# Patient Record
Sex: FEMALE | Race: WHITE | NOT HISPANIC OR LATINO | Employment: FULL TIME | URBAN - METROPOLITAN AREA
[De-identification: names, ages, dates, MRNs, and addresses within clinical notes are randomized per-mention and may not be internally consistent; named-entity substitution may affect disease eponyms.]

---

## 2019-08-22 ENCOUNTER — TRANSCRIBE ORDERS (OUTPATIENT)
Dept: ADMINISTRATIVE | Facility: HOSPITAL | Age: 39
End: 2019-08-22

## 2019-08-22 DIAGNOSIS — Z12.31 VISIT FOR SCREENING MAMMOGRAM: Primary | ICD-10-CM

## 2019-10-18 ENCOUNTER — APPOINTMENT (OUTPATIENT)
Dept: MAMMOGRAPHY | Facility: HOSPITAL | Age: 39
End: 2019-10-18

## 2020-01-02 ENCOUNTER — HOSPITAL ENCOUNTER (OUTPATIENT)
Dept: MAMMOGRAPHY | Facility: HOSPITAL | Age: 40
Discharge: HOME | End: 2020-01-02
Admitting: OBSTETRICS & GYNECOLOGY

## 2020-01-02 DIAGNOSIS — Z12.31 VISIT FOR SCREENING MAMMOGRAM: ICD-10-CM

## 2020-01-02 PROCEDURE — 77063 BREAST TOMOSYNTHESIS BI: CPT

## 2020-01-02 PROCEDURE — 77067 SCR MAMMO BI INCL CAD: CPT

## 2020-01-02 PROCEDURE — 77063 BREAST TOMOSYNTHESIS BI: CPT | Performed by: RADIOLOGY

## 2020-01-02 PROCEDURE — 77067 SCR MAMMO BI INCL CAD: CPT | Performed by: RADIOLOGY

## 2020-01-30 ENCOUNTER — HOSPITAL ENCOUNTER (OUTPATIENT)
Dept: MAMMOGRAPHY | Facility: HOSPITAL | Age: 40
Discharge: HOME | End: 2020-01-30
Admitting: RADIOLOGY

## 2020-01-30 ENCOUNTER — TRANSCRIBE ORDERS (OUTPATIENT)
Dept: MAMMOGRAPHY | Facility: HOSPITAL | Age: 40
End: 2020-01-30

## 2020-01-30 ENCOUNTER — HOSPITAL ENCOUNTER (OUTPATIENT)
Dept: ULTRASOUND IMAGING | Facility: HOSPITAL | Age: 40
Discharge: HOME | End: 2020-01-30

## 2020-01-30 DIAGNOSIS — R92.8 ABNORMAL MAMMOGRAM: Primary | ICD-10-CM

## 2020-01-30 DIAGNOSIS — R92.8 ABNORMAL MAMMOGRAM: ICD-10-CM

## 2020-01-30 PROCEDURE — 76641 ULTRASOUND BREAST COMPLETE: CPT | Performed by: RADIOLOGY

## 2020-01-30 PROCEDURE — 76641 ULTRASOUND BREAST COMPLETE: CPT

## 2020-01-30 PROCEDURE — 77066 DX MAMMO INCL CAD BI: CPT | Performed by: RADIOLOGY

## 2020-01-30 PROCEDURE — 77062 BREAST TOMOSYNTHESIS BI: CPT | Performed by: RADIOLOGY

## 2020-01-30 PROCEDURE — 77066 DX MAMMO INCL CAD BI: CPT

## 2020-01-30 PROCEDURE — G0279 TOMOSYNTHESIS, MAMMO: HCPCS

## 2020-07-31 ENCOUNTER — APPOINTMENT (OUTPATIENT)
Dept: MAMMOGRAPHY | Facility: HOSPITAL | Age: 40
End: 2020-07-31

## 2020-09-08 ENCOUNTER — TELEPHONE (OUTPATIENT)
Dept: URGENT CARE | Facility: CLINIC | Age: 40
End: 2020-09-08

## 2020-09-08 DIAGNOSIS — H66.90 EAR INFECTION: Primary | ICD-10-CM

## 2020-09-08 RX ORDER — CEPHALEXIN 500 MG/1
500 CAPSULE ORAL 3 TIMES DAILY
Qty: 21 CAPSULE | Refills: 0 | Status: SHIPPED | OUTPATIENT
Start: 2020-09-08 | End: 2022-05-05

## 2021-01-07 ENCOUNTER — TRANSCRIBE ORDERS (OUTPATIENT)
Dept: ADMINISTRATIVE | Facility: HOSPITAL | Age: 41
End: 2021-01-07

## 2021-01-07 DIAGNOSIS — R92.8 ABNORMAL MAMMOGRAM: Primary | ICD-10-CM

## 2021-01-26 ENCOUNTER — HOSPITAL ENCOUNTER (OUTPATIENT)
Dept: MAMMOGRAPHY | Facility: HOSPITAL | Age: 41
Discharge: HOME | End: 2021-01-26

## 2021-01-26 ENCOUNTER — HOSPITAL ENCOUNTER (OUTPATIENT)
Dept: ULTRASOUND IMAGING | Facility: HOSPITAL | Age: 41
Discharge: HOME | End: 2021-01-26

## 2021-01-26 DIAGNOSIS — R92.8 ABNORMAL MAMMOGRAM: ICD-10-CM

## 2021-01-26 PROCEDURE — 76642 ULTRASOUND BREAST LIMITED: CPT | Performed by: RADIOLOGY

## 2021-01-26 PROCEDURE — 77062 BREAST TOMOSYNTHESIS BI: CPT | Performed by: RADIOLOGY

## 2021-01-26 PROCEDURE — 76642 ULTRASOUND BREAST LIMITED: CPT

## 2021-01-26 PROCEDURE — 77066 DX MAMMO INCL CAD BI: CPT

## 2021-01-26 PROCEDURE — 77066 DX MAMMO INCL CAD BI: CPT | Performed by: RADIOLOGY

## 2021-01-26 PROCEDURE — G0279 TOMOSYNTHESIS, MAMMO: HCPCS

## 2021-02-10 ENCOUNTER — APPOINTMENT (OUTPATIENT)
Dept: MAMMOGRAPHY | Facility: HOSPITAL | Age: 41
End: 2021-02-10

## 2021-12-27 ENCOUNTER — TELEPHONE (OUTPATIENT)
Dept: OBSTETRICS AND GYNECOLOGY | Facility: CLINIC | Age: 41
End: 2021-12-27

## 2021-12-27 ENCOUNTER — TRANSCRIBE ORDERS (OUTPATIENT)
Dept: ADMINISTRATIVE | Facility: HOSPITAL | Age: 41
End: 2021-12-27

## 2021-12-27 DIAGNOSIS — Z87.898 HISTORY OF ABNORMAL MAMMOGRAM: Primary | ICD-10-CM

## 2021-12-27 DIAGNOSIS — Z12.31 VISIT FOR SCREENING MAMMOGRAM: Primary | ICD-10-CM

## 2022-02-02 ENCOUNTER — OFFICE VISIT (OUTPATIENT)
Dept: OBSTETRICS AND GYNECOLOGY | Facility: CLINIC | Age: 42
End: 2022-02-02

## 2022-02-02 VITALS
HEIGHT: 66 IN | SYSTOLIC BLOOD PRESSURE: 104 MMHG | WEIGHT: 146.2 LBS | DIASTOLIC BLOOD PRESSURE: 64 MMHG | BODY MASS INDEX: 23.5 KG/M2

## 2022-02-02 DIAGNOSIS — Z01.419 WOMEN'S ANNUAL ROUTINE GYNECOLOGICAL EXAMINATION: Primary | ICD-10-CM

## 2022-02-02 DIAGNOSIS — N92.3 INTERMENSTRUAL BLEEDING: ICD-10-CM

## 2022-02-02 PROCEDURE — 99396 PREV VISIT EST AGE 40-64: CPT | Performed by: NURSE PRACTITIONER

## 2022-02-02 NOTE — PROGRESS NOTES
GYN Annual Exam     CC - Here for annual exam.     Subjective   HPI  Elvia Inga Metzger is a 41 y.o. female, , who presents for annual well woman exam. Patient's last menstrual period was 01/15/2022 (exact date)..  Periods are regular every 25-35 days, lasting 5 days. The flow is moderate.  Dysmenorrhea:none.  Patient reports problems with: intermenstrual bleeding and heavier than normal period flow.  She reports that this has been occurring since the end of the .  She also c/o spotting after exercise.  Partner Status: Marital Status: .  New Partners since last visit: no.  Desires STD Screening: no.  Patient has not had the HPV vaccine. She states she was suppose to have a possible D & C for thickened endometrium a couple of years ago but did not have this procedure done.     Additional OB/GYN History     Current contraception: contraceptive methods: Vasectomy   Desires to: do not start contraception  Last Pap : 2019- Pap w/ reflex- negative  Last Completed Pap Smear     This patient has no relevant Health Maintenance data.        History of abnormal Pap smear: no  Family history of uterine, colon, breast, or ovarian cancer: no  Previous Mammogram :  yes - 2021  Performs monthly Self-Breast Exam: no  Exercises Regularly:yes  Feelings of Anxiety or Depression: no  Tobacco Usage?: No   OB History        5    Para   4    Term   4            AB   1    Living           SAB   1    IAB        Ectopic        Molar        Multiple        Live Births                    Health Maintenance   Topic Date Due   • Annual Gynecologic Pelvic and Breast Exam  Never done   • ANNUAL PHYSICAL  Never done   • HEPATITIS C SCREENING  Never done   • PAP SMEAR  Never done   • TDAP/TD VACCINES (3 - Td or Tdap) 2026   • COVID-19 Vaccine  Completed   • INFLUENZA VACCINE  Completed   • Pneumococcal Vaccine 0-64  Aged Out     Past Surgical History:   Procedure Laterality Date   • TONSILLECTOMY   "   • TYMPANOSTOMY TUBE PLACEMENT             The additional following portions of the patient's history were reviewed and updated as appropriate: allergies, current medications, past family history, past medical history, past social history, past surgical history and problem list.    Review of Systems   Constitutional: Negative.    Cardiovascular: Negative.    Gastrointestinal: Negative.    Genitourinary: Positive for menstrual problem.   Psychiatric/Behavioral: Negative.        I have reviewed and agree with the HPI, ROS, and historical information as entered above. Lena Ochoa, APRN    Objective   /64   Ht 167.6 cm (66\")   Wt 66.3 kg (146 lb 3.2 oz)   LMP 01/15/2022 (Exact Date)   Breastfeeding No   BMI 23.60 kg/m²     Physical Exam  Vitals and nursing note reviewed. Exam conducted with a chaperone present.   Constitutional:       Appearance: She is well-developed.   HENT:      Head: Normocephalic and atraumatic.   Neck:      Thyroid: No thyroid mass or thyromegaly.   Cardiovascular:      Rate and Rhythm: Normal rate and regular rhythm.      Heart sounds: No murmur heard.      Pulmonary:      Effort: Pulmonary effort is normal. No retractions.      Breath sounds: Normal breath sounds. No wheezing, rhonchi or rales.   Chest:      Chest wall: No mass or tenderness.   Breasts:      Right: Normal. No mass, nipple discharge, skin change or tenderness.      Left: Normal. No mass, nipple discharge, skin change or tenderness.       Abdominal:      General: Bowel sounds are normal.      Palpations: Abdomen is soft. Abdomen is not rigid. There is no mass.      Tenderness: There is no abdominal tenderness. There is no guarding.      Hernia: No hernia is present.   Genitourinary:     General: Normal vulva.      Labia:         Right: No rash, tenderness or lesion.         Left: No rash, tenderness or lesion.       Vagina: Normal. No vaginal discharge or lesions.      Cervix: No cervical motion tenderness, " discharge, lesion or cervical bleeding.      Uterus: Normal. Not enlarged, not fixed and not tender.       Adnexa: Right adnexa normal and left adnexa normal.        Right: No mass or tenderness.          Left: No mass or tenderness.        Rectum: Normal. No external hemorrhoid.   Musculoskeletal:      Cervical back: Normal range of motion. No muscular tenderness.   Neurological:      Mental Status: She is alert and oriented to person, place, and time.   Psychiatric:         Behavior: Behavior normal.         Assessment/Plan       Encounter Diagnosis   Name Primary?   • Women's annual routine gynecological examination Yes       Plan     1. Recommended use of Vitamin D replacement and getting adequate calcium in her diet. (1500mg)  2. Reviewed monthly self breast exams.  Instructed to call with lumps, pain, or breast discharge.    3. Continue yearly mammography  4. Reviewed HPV guidelines.  5. Reviewed exercise as a preventative health measures.   6. Pt. Will schedule GYN ultrasound due to intramenstrual bleeding and past h/o thickened endometrium.   7. Has screening mammogram scheduled in 7/2022.       YAHIR Thompson  02/02/2022

## 2022-02-07 ENCOUNTER — TELEPHONE (OUTPATIENT)
Dept: OBSTETRICS AND GYNECOLOGY | Facility: CLINIC | Age: 42
End: 2022-02-07

## 2022-02-07 NOTE — TELEPHONE ENCOUNTER
Pt. Being seen for US evaluation of intermenstrual bleeding occurring around 3-4 days after end of her period. Rescheduled to the time where to bleeding normally occurs to check emc. Pt. VU

## 2022-02-07 NOTE — TELEPHONE ENCOUNTER
Patient called and stated that she is scheduled to come in next week for a us/fu and requested to speak with nurse to clarify whether or not it mattered if she is on cycle or not. She said that she is scheduled to be on cycle Monday, and wanted to double check with clinical.

## 2022-02-15 DIAGNOSIS — Z01.419 WOMEN'S ANNUAL ROUTINE GYNECOLOGICAL EXAMINATION: ICD-10-CM

## 2022-02-23 ENCOUNTER — OFFICE VISIT (OUTPATIENT)
Dept: OBSTETRICS AND GYNECOLOGY | Facility: CLINIC | Age: 42
End: 2022-02-23

## 2022-02-23 VITALS
SYSTOLIC BLOOD PRESSURE: 114 MMHG | HEIGHT: 66 IN | WEIGHT: 146.2 LBS | BODY MASS INDEX: 23.5 KG/M2 | DIASTOLIC BLOOD PRESSURE: 68 MMHG

## 2022-02-23 DIAGNOSIS — N92.3 INTERMENSTRUAL BLEEDING: ICD-10-CM

## 2022-02-23 DIAGNOSIS — N92.0 MENORRHAGIA WITH REGULAR CYCLE: ICD-10-CM

## 2022-02-23 DIAGNOSIS — N92.3 INTERMENSTRUAL BLEEDING: Primary | ICD-10-CM

## 2022-02-23 DIAGNOSIS — R93.89 THICKENED ENDOMETRIUM: Primary | ICD-10-CM

## 2022-02-23 DIAGNOSIS — R93.89 THICKENED ENDOMETRIUM: ICD-10-CM

## 2022-02-23 PROCEDURE — 99213 OFFICE O/P EST LOW 20 MIN: CPT | Performed by: NURSE PRACTITIONER

## 2022-02-23 NOTE — PROGRESS NOTES
Chief Complaint   Patient presents with   • Gynecologic Exam   • H/O thickened endometrium     follow up    • TVU today       Subjective   HPI  Elvia Metzger is a 42 y.o. female, , who presents for intramenstrual bleeding and history of thickened endometrium follow up. Patient had TVU today as well. Patient states she is still having intramenstrual bleeding in between her cycles and heavy periods. She was supposed to have a D & C with  in  but due to Covid happening, she never ended up scheduling this.     She states she has experienced intramenstrual bleeding for 1 year.  She describes the severity as mild - moderate.  She states that the problem is intermittent.  The patient reports additional symptoms as none.      Her last LMP was Patient's last menstrual period was 2022 (exact date).  Periods are regular every 28-30 days, lasting 4-5 days.  Dysmenorrhea:none.  Patient reports problems with: none.  Partner Status: Marital Status: .  New Partners since last visit: no.  Desires STD Screening: no.    Additional OB/GYN History   Current contraception: contraceptive methods: Vasectomy   Desires to: continue contraception  Last Pap : 2022 showed atypical cells and HPV negative  Last Completed Pap Smear          Ordered - PAP SMEAR (Every 3 Years) Ordered on 2/15/2022    2022  SCANNED - PAP SMEAR              History of abnormal Pap smear: yes - ASCUS HPV negative  Last mammogram: 2021  Last Completed Mammogram     This patient has no relevant Health Maintenance data.        Tobacco Usage?: No   OB History        5    Para   4    Term   4            AB   1    Living           SAB   1    IAB        Ectopic        Molar        Multiple        Live Births                    Health Maintenance   Topic Date Due   • ANNUAL PHYSICAL  Never done   • HEPATITIS C SCREENING  Never done   • Annual Gynecologic Pelvic and Breast Exam  2023   • PAP SMEAR   "02/02/2025   • TDAP/TD VACCINES (3 - Td or Tdap) 08/17/2026   • COVID-19 Vaccine  Completed   • INFLUENZA VACCINE  Completed   • Pneumococcal Vaccine 0-64  Aged Out       The additional following portions of the patient's history were reviewed and updated as appropriate: allergies, current medications, past family history, past medical history, past social history, past surgical history and problem list.    Review of Systems   Constitutional: Negative.    Gastrointestinal: Negative.    Genitourinary: Menstrual problem: menorrhagia, intramenstrual bleeding.       I have reviewed and agree with the HPI, ROS, and historical information as entered above. Lena Ochoa APRN    Objective   /68   Ht 167.6 cm (65.98\")   Wt 66.3 kg (146 lb 3.2 oz)   LMP 02/12/2022 (Exact Date)   Breastfeeding No   BMI 23.61 kg/m²     Physical Exam  Vitals and nursing note reviewed.   Constitutional:       Appearance: Normal appearance. She is normal weight.   Neurological:      Mental Status: She is alert.         Assessment/Plan     Assessment     Problem List Items Addressed This Visit     None      Visit Diagnoses     Thickened endometrium    -  Primary    Intermenstrual bleeding        Menorrhagia with regular cycle              1. Thickened endometrium  2. Intramenstrual bleeding  3. Menorrhagia  4. GYN ultrasound (endometrium heterogeneous, multiple echogenic areas seen throughout emc with blood flow, showing characteristics of potential polyps).    Plan     Ultrasound findings reviewed with . Plan: will schedule D & C with myosure since possible polyps.  Pt. Agrees with this plan.  3.   Pt notified will check insurance benefits and schedule pre op appt. With .   4.   Pap smear showed ASCUS HPV negative on 2/2/2022.      YAHIR Thompson  02/23/2022  "

## 2022-05-05 ENCOUNTER — OFFICE VISIT (OUTPATIENT)
Dept: OBSTETRICS AND GYNECOLOGY | Facility: CLINIC | Age: 42
End: 2022-05-05

## 2022-05-05 VITALS
SYSTOLIC BLOOD PRESSURE: 116 MMHG | BODY MASS INDEX: 23.95 KG/M2 | WEIGHT: 149 LBS | DIASTOLIC BLOOD PRESSURE: 74 MMHG | HEIGHT: 66 IN

## 2022-05-05 DIAGNOSIS — N92.4 EXCESSIVE BLEEDING IN PREMENOPAUSAL PERIOD: Primary | ICD-10-CM

## 2022-05-05 PROCEDURE — 99213 OFFICE O/P EST LOW 20 MIN: CPT | Performed by: OBSTETRICS & GYNECOLOGY

## 2022-05-05 NOTE — PROGRESS NOTES
Pre-Op Visit    Chief Complaint   Patient presents with   • Pre-op Exam       HPI  Elvia Metzger is 42 y.o.  scheduled to have hysteroscopy D&C possible myosure at [facility] on 2022 at [facility].  Her pre operative diagnosis is     ICD-10-CM ICD-9-CM   1. Excessive bleeding in premenopausal period  N92.4 627.0   . Her last menstrual period was Patient's last menstrual period was 04/10/2022 (exact date)..  Her birth control method is husbands vasectomy. Her BMI is Body mass index is 24.06 kg/m²..    She has review the informational video on D&C.    She has review the AC Pamphlet on D&C.    She understand the risks of bleeding, infections, possible damage to other organ systems, including but not limited to the gastrointestinal tract and genitourinary tract. She also understands the specific risks listed in the pre op information (video ,pamphlets, etc.)    She has review and signed the pre op consent form.    She has been instructed to have a light dinner the night before surgery, then nothing to eat or drink after midnight.  She is on the following medications:  Outpatient Encounter Medications as of 2022   Medication Sig Dispense Refill   • fexofenadine (ALLEGRA) 180 MG tablet Take 180 mg by mouth Daily.     • [DISCONTINUED] amoxicillin-clavulanate (Augmentin) 875-125 MG per tablet Take 1 tablet by mouth 2 (Two) Times a Day. 20 tablet 0   • [DISCONTINUED] cephalexin (KEFLEX) 500 MG capsule Take 1 capsule by mouth 3 (Three) Times a Day. 21 capsule 0   • [DISCONTINUED] fluticasone (FLONASE) 50 MCG/ACT nasal spray fluticasone propionate 50 mcg/actuation nasal spray,suspension   Spray 2 sprays every day by intranasal route.     • [DISCONTINUED] predniSONE (DELTASONE) 10 MG tablet 6 tabs po x 1 day/5/4/3/2/1/stop; tapering dose x 6 days. 21 tablet 0     No facility-administered encounter medications on file as of 2022.         The day of surgery, do not chew gum or smoke. Remove  all jewelry, nail polish and contact lens prior to coming to the hospital. Do not bring large sums of money or valuables.  Arrive at the hospital at 6:30 am.  You will talk with the anesthesiologist that morning.  An IV will be started to provide fluids and sedation.  The procedure will take approximately 1 hour(s).  You will need to have someone drive you home after the surgery.    Pre Admission testing has been scheduled for Today at office.    She has confirmed that she is not allergic to latex.      Review of Systems   Constitutional: Negative.    HENT: Negative.    Eyes: Negative.    Respiratory: Negative.    Cardiovascular: Negative.    Gastrointestinal: Negative.    Endocrine: Negative.    Genitourinary: Positive for menstrual problem.   Musculoskeletal: Negative.    Skin: Negative.    Allergic/Immunologic: Negative.    Neurological: Negative.    Hematological: Negative.    Psychiatric/Behavioral: Negative.           Physical Exam  Vitals and nursing note reviewed. Exam conducted with a chaperone present.   Constitutional:       Appearance: She is well-developed.   HENT:      Head: Normocephalic and atraumatic.   Cardiovascular:      Rate and Rhythm: Normal rate and regular rhythm.   Pulmonary:      Effort: Pulmonary effort is normal.      Breath sounds: Normal breath sounds.   Abdominal:      General: Bowel sounds are normal.      Palpations: Abdomen is soft. Abdomen is not rigid.   Musculoskeletal:      Cervical back: Normal range of motion. No muscular tenderness.   Skin:     General: Skin is warm and dry.   Neurological:      Mental Status: She is alert and oriented to person, place, and time.   Psychiatric:         Behavior: Behavior normal.            Assessment:    ICD-10-CM ICD-9-CM   1. Excessive bleeding in premenopausal period  N92.4 627.0           Instructions:  1. UPT day of surgery  2. DIscussed difference between Myosure and Novasure and pros and cons of each.  She will consider but currently  planning Myosure.  3. Review with the patient the risk of bleeding, infections, possible damage to other organ systems, including but not limited to the gastrointestinal tract and genitourinary tract.  Reviewed the risk of anesthesia as well as the risk the surgery will not produce the desired results.  Operative permit signed and scanned into the chart.  4. Reviewed the risk of perforation of the uterus and potential increased risk of hysterectomy with the NOvasure.    Sherley Urena MD

## 2022-05-06 ENCOUNTER — TELEPHONE (OUTPATIENT)
Dept: OBSTETRICS AND GYNECOLOGY | Facility: CLINIC | Age: 42
End: 2022-05-06

## 2022-05-06 ENCOUNTER — CLINICAL SUPPORT NO REQUIREMENTS (OUTPATIENT)
Dept: PREADMISSION TESTING | Facility: HOSPITAL | Age: 42
End: 2022-05-06

## 2022-05-06 DIAGNOSIS — Z01.818 PRE-OP TESTING: Primary | ICD-10-CM

## 2022-05-06 LAB — SARS-COV-2 RNA PNL SPEC NAA+PROBE: NOT DETECTED

## 2022-05-06 PROCEDURE — C9803 HOPD COVID-19 SPEC COLLECT: HCPCS

## 2022-05-06 PROCEDURE — U0004 COV-19 TEST NON-CDC HGH THRU: HCPCS

## 2022-05-06 NOTE — TELEPHONE ENCOUNTER
Patient has decided to proceed with novasure ablation vs myosure on 05/09/22, notified [facility] of changes.

## 2022-05-09 ENCOUNTER — OUTSIDE FACILITY SERVICE (OUTPATIENT)
Dept: OBSTETRICS AND GYNECOLOGY | Facility: CLINIC | Age: 42
End: 2022-05-09

## 2022-05-09 ENCOUNTER — LAB REQUISITION (OUTPATIENT)
Dept: LAB | Facility: HOSPITAL | Age: 42
End: 2022-05-09

## 2022-05-09 DIAGNOSIS — N92.3 OVULATION BLEEDING: ICD-10-CM

## 2022-05-09 PROCEDURE — 88305 TISSUE EXAM BY PATHOLOGIST: CPT | Performed by: OBSTETRICS & GYNECOLOGY

## 2022-05-09 PROCEDURE — 58563 HYSTEROSCOPY ABLATION: CPT | Performed by: OBSTETRICS & GYNECOLOGY

## 2022-05-10 LAB
CYTO UR: NORMAL
LAB AP CASE REPORT: NORMAL
LAB AP CLINICAL INFORMATION: NORMAL
PATH REPORT.FINAL DX SPEC: NORMAL
PATH REPORT.GROSS SPEC: NORMAL

## 2022-05-26 ENCOUNTER — OFFICE VISIT (OUTPATIENT)
Dept: OBSTETRICS AND GYNECOLOGY | Facility: CLINIC | Age: 42
End: 2022-05-26

## 2022-05-26 VITALS
HEIGHT: 66 IN | DIASTOLIC BLOOD PRESSURE: 72 MMHG | SYSTOLIC BLOOD PRESSURE: 120 MMHG | WEIGHT: 146 LBS | BODY MASS INDEX: 23.46 KG/M2

## 2022-05-26 DIAGNOSIS — Z48.89 POSTOPERATIVE VISIT: Primary | ICD-10-CM

## 2022-05-26 DIAGNOSIS — Z98.890 STATUS POST ENDOMETRIAL ABLATION: ICD-10-CM

## 2022-05-26 PROCEDURE — 99213 OFFICE O/P EST LOW 20 MIN: CPT | Performed by: OBSTETRICS & GYNECOLOGY

## 2022-05-26 NOTE — PROGRESS NOTES
"Post-Op Visit    Chief Complaint   Patient presents with   • Post-op       HPI  Elvia Metzger is a 42 y.o. female who returns for a routine post-operative follow-up visit after undergoing Novasure ablation for AUB on 05/09/2022.      Since the patient was last seen, she reports mild vag d/c no itching or odor.    She states since the procedure, she has had no change in their activity level.    The additional following portions of the patient's history were reviewed and updated as appropriate: allergies, past family history, past medical history, past social history, past surgical history and problem list.    Review of Systems   Constitutional: Negative.    HENT: Negative.    Eyes: Negative.    Respiratory: Negative.    Cardiovascular: Negative.    Gastrointestinal: Negative.    Endocrine: Negative.    Genitourinary: Negative.    Musculoskeletal: Negative.    Skin: Negative.    Allergic/Immunologic: Negative.    Neurological: Negative.    Hematological: Negative.    Psychiatric/Behavioral: Negative.        I have reviewed and agree with the HPI, ROS, and historical information as entered above. Sherley Urena MD    Objective   /72   Ht 167.6 cm (66\")   Wt 66.2 kg (146 lb)   LMP 05/09/2022 (Exact Date)   Breastfeeding No   BMI 23.57 kg/m²     Physical Exam  Constitutional:       Appearance: She is well-developed.   HENT:      Head: Normocephalic.   Eyes:      Conjunctiva/sclera: Conjunctivae normal.   Pulmonary:      Effort: Pulmonary effort is normal.   Psychiatric:         Behavior: Behavior normal.         Assessment & Plan     Assessment     Problem List Items Addressed This Visit        Genitourinary and Reproductive     Status post endometrial ablation      Other Visit Diagnoses     Postoperative visit    -  Primary                Instructions:  1. The patient has done well after surgery with no apparent complications. I have discussed the postoperative course to date, as well as, the " expected progress going forward.  The patient understands what complications to be concerned about. I will see the patient in routine follow-up, or sooner if needed.      Sherley Urena MD

## 2022-07-28 ENCOUNTER — HOSPITAL ENCOUNTER (OUTPATIENT)
Dept: MAMMOGRAPHY | Facility: HOSPITAL | Age: 42
Discharge: HOME | End: 2022-07-28

## 2022-07-28 ENCOUNTER — TRANSCRIBE ORDERS (OUTPATIENT)
Dept: MAMMOGRAPHY | Facility: HOSPITAL | Age: 42
End: 2022-07-28

## 2022-07-28 ENCOUNTER — HOSPITAL ENCOUNTER (OUTPATIENT)
Dept: ULTRASOUND IMAGING | Facility: HOSPITAL | Age: 42
Discharge: HOME | End: 2022-07-28

## 2022-07-28 DIAGNOSIS — Z87.898 HISTORY OF ABNORMAL MAMMOGRAM: ICD-10-CM

## 2022-07-28 DIAGNOSIS — R92.8 ABNORMAL MAMMOGRAM: Primary | ICD-10-CM

## 2022-07-28 PROCEDURE — 77062 BREAST TOMOSYNTHESIS BI: CPT | Performed by: RADIOLOGY

## 2022-07-28 PROCEDURE — 77066 DX MAMMO INCL CAD BI: CPT | Performed by: RADIOLOGY

## 2022-07-28 PROCEDURE — 76642 ULTRASOUND BREAST LIMITED: CPT | Performed by: RADIOLOGY

## 2022-07-28 PROCEDURE — 77066 DX MAMMO INCL CAD BI: CPT

## 2022-07-28 PROCEDURE — G0279 TOMOSYNTHESIS, MAMMO: HCPCS

## 2022-07-28 PROCEDURE — 76642 ULTRASOUND BREAST LIMITED: CPT

## 2022-09-21 ENCOUNTER — HOSPITAL ENCOUNTER (OUTPATIENT)
Dept: ULTRASOUND IMAGING | Facility: HOSPITAL | Age: 42
Discharge: HOME | End: 2022-09-21
Admitting: RADIOLOGY

## 2022-09-21 DIAGNOSIS — R92.8 ABNORMAL MAMMOGRAM: ICD-10-CM

## 2022-09-21 PROCEDURE — 0 LIDOCAINE 1 % SOLUTION: Performed by: RADIOLOGY

## 2022-09-21 PROCEDURE — 76942 ECHO GUIDE FOR BIOPSY: CPT

## 2022-09-21 PROCEDURE — 19000 PUNCTURE ASPIR CYST BREAST: CPT | Performed by: RADIOLOGY

## 2022-09-21 PROCEDURE — 76942 ECHO GUIDE FOR BIOPSY: CPT | Performed by: RADIOLOGY

## 2022-09-21 RX ORDER — LIDOCAINE HYDROCHLORIDE 10 MG/ML
5 INJECTION, SOLUTION INFILTRATION; PERINEURAL ONCE
Status: COMPLETED | OUTPATIENT
Start: 2022-09-21 | End: 2022-09-21

## 2022-09-21 RX ADMIN — Medication 5 ML: at 14:52

## 2022-09-22 LAB — REF LAB TEST METHOD: NORMAL

## 2022-09-23 ENCOUNTER — TELEPHONE (OUTPATIENT)
Dept: MAMMOGRAPHY | Facility: HOSPITAL | Age: 42
End: 2022-09-23

## 2022-09-23 NOTE — TELEPHONE ENCOUNTER
Patient notified of cytology results and recommendations. Verbalizes understanding. Denies discomfort.  Denies signs and symptoms of infection. Questions answered, verbalized understanding.

## 2022-10-06 ENCOUNTER — TRANSCRIBE ORDERS (OUTPATIENT)
Dept: ADMINISTRATIVE | Facility: HOSPITAL | Age: 42
End: 2022-10-06

## 2022-10-06 DIAGNOSIS — R92.8 ABNORMAL MAMMOGRAM: Primary | ICD-10-CM

## 2023-03-14 ENCOUNTER — OFFICE VISIT (OUTPATIENT)
Dept: OBSTETRICS AND GYNECOLOGY | Facility: CLINIC | Age: 43
End: 2023-03-14
Payer: COMMERCIAL

## 2023-03-14 VITALS
WEIGHT: 150 LBS | HEIGHT: 66 IN | SYSTOLIC BLOOD PRESSURE: 100 MMHG | BODY MASS INDEX: 24.11 KG/M2 | DIASTOLIC BLOOD PRESSURE: 62 MMHG

## 2023-03-14 DIAGNOSIS — Z01.419 WOMEN'S ANNUAL ROUTINE GYNECOLOGICAL EXAMINATION: Primary | ICD-10-CM

## 2023-03-14 DIAGNOSIS — Z98.890 STATUS POST ENDOMETRIAL ABLATION: ICD-10-CM

## 2023-03-14 DIAGNOSIS — R87.610 ATYPICAL SQUAMOUS CELLS OF UNDETERMINED SIGNIFICANCE ON CYTOLOGIC SMEAR OF CERVIX (ASC-US): ICD-10-CM

## 2023-03-14 DIAGNOSIS — Z12.39 ENCOUNTER FOR BREAST CANCER SCREENING USING NON-MAMMOGRAM MODALITY: ICD-10-CM

## 2023-03-14 PROCEDURE — 99396 PREV VISIT EST AGE 40-64: CPT | Performed by: OBSTETRICS & GYNECOLOGY

## 2023-03-14 RX ORDER — MULTIPLE VITAMINS W/ MINERALS TAB 9MG-400MCG
1 TAB ORAL DAILY
COMMUNITY

## 2023-03-14 NOTE — PROGRESS NOTES
Gynecologic Annual Exam Note          GYN Annual Exam     Gynecologic Exam        Subjective     HPI  Elvia Metzger is a 43 y.o. female, , who presents for annual well woman exam as a established patient. No LMP recorded. Patient has had an ablation. Patient reports problems with: none. Her periods are absent secondary to endometrial ablation.Partner Status: Marital Status: . She is is sexually active. She has not had new partners. STD testing recommendations have been explained to the patient and she does not desire STD testing. There were no changes to her medical or surgical history since her last visit.       Additional OB/GYN History   Current contraception: contraceptive methods: Vasectomy   Desires to: do not start contraception    Last Pap : 2022. Result: ASCUS. HPV: negative.   Last Completed Pap Smear          Ordered - PAP SMEAR (Every 3 Years) Ordered on 3/14/2023    2022  SCANNED - PAP SMEAR              History of abnormal Pap smear: yes - repeats normal per patient  Family history of uterine, colon, breast, or ovarian cancer: no  Performs monthly Self-Breast Exam: no, not monthly  Last mammogram: 2022. Done at .  Last Completed Mammogram     This patient has no relevant Health Maintenance data.          History of abnormal mammogram: yes - h/o breast biopsy 2022- benign per patient    Colonoscopy: has never had a colonoscopy.  Exercises Regularly: yes  Feelings of Anxiety or Depression: no  Tobacco Usage?: No       Current Outpatient Medications:   •  fexofenadine (ALLEGRA) 180 MG tablet, Take 1 tablet by mouth Daily., Disp: , Rfl:   •  multivitamin with minerals (MULTIVITAMIN ADULT PO), Take 1 tablet by mouth Daily., Disp: , Rfl:      Patient denies the need for medication refills today.    OB History        5    Para   4    Term   4            AB   1    Living   4       SAB   1    IAB        Ectopic        Molar        Multiple      "   Live Births   4                Past Medical History:   Diagnosis Date   • Abnormal Pap smear of cervix 2/15/2022   • Dysmenorrhea    • Multiple allergies         Past Surgical History:   Procedure Laterality Date   • ENDOMETRIAL ABLATION W/ NOVASURE  05/09/2022   • TONSILLECTOMY     • TYMPANOSTOMY TUBE PLACEMENT     • US GUIDED CYST ASPIRATION BREAST N/A 09/21/2022   • WISDOM TOOTH EXTRACTION  2000       Health Maintenance   Topic Date Due   • HEPATITIS C SCREENING  Never done   • ANNUAL PHYSICAL  Never done   • COVID-19 Vaccine (4 - Booster for Pfizer series) 02/05/2022   • INFLUENZA VACCINE  08/01/2022   • Annual Gynecologic Pelvic and Breast Exam  02/03/2023   • PAP SMEAR  02/02/2025   • TDAP/TD VACCINES (3 - Td or Tdap) 08/17/2026   • Pneumococcal Vaccine 0-64  Aged Out       The additional following portions of the patient's history were reviewed and updated as appropriate: allergies, current medications, past family history, past medical history, past social history, past surgical history and problem list.    Review of Systems   Constitutional: Negative.    HENT: Negative.    Eyes: Negative.    Respiratory: Negative.    Cardiovascular: Negative.    Gastrointestinal: Negative.    Endocrine: Negative.    Genitourinary: Negative.    Musculoskeletal: Negative.    Skin: Negative.    Allergic/Immunologic: Negative.    Neurological: Negative.    Hematological: Negative.    Psychiatric/Behavioral: Negative.          I have reviewed and agree with the HPI, ROS, and historical information as entered above. Sherley Urena MD      Objective   /62   Ht 167.6 cm (66\")   Wt 68 kg (150 lb)   BMI 24.21 kg/m²     Physical Exam  Vitals and nursing note reviewed. Exam conducted with a chaperone present.   Constitutional:       Appearance: She is well-developed.   HENT:      Head: Normocephalic and atraumatic.   Neck:      Thyroid: No thyroid mass or thyromegaly.   Cardiovascular:      Rate and Rhythm: Normal rate and " regular rhythm.      Heart sounds: No murmur heard.  Pulmonary:      Effort: Pulmonary effort is normal. No retractions.      Breath sounds: Normal breath sounds. No wheezing, rhonchi or rales.   Chest:      Chest wall: No mass or tenderness.   Breasts:     Right: Normal. No mass, nipple discharge, skin change or tenderness.      Left: Normal. No mass, nipple discharge, skin change or tenderness.   Abdominal:      General: Bowel sounds are normal.      Palpations: Abdomen is soft. Abdomen is not rigid. There is no mass.      Tenderness: There is no abdominal tenderness. There is no guarding.      Hernia: No hernia is present. There is no hernia in the left inguinal area.   Genitourinary:     Labia:         Right: No rash, tenderness or lesion.         Left: No rash, tenderness or lesion.       Vagina: Normal. No vaginal discharge or lesions.      Cervix: No cervical motion tenderness, discharge, lesion or cervical bleeding.      Uterus: Normal. Not enlarged, not fixed and not tender.       Adnexa:         Right: No mass or tenderness.          Left: No mass or tenderness.        Rectum: No external hemorrhoid.   Musculoskeletal:      Cervical back: Normal range of motion. No muscular tenderness.   Neurological:      Mental Status: She is alert and oriented to person, place, and time.   Psychiatric:         Behavior: Behavior normal.            Assessment and Plan    Problem List Items Addressed This Visit        Genitourinary and Reproductive     Status post endometrial ablation   Other Visit Diagnoses     Women's annual routine gynecological examination    -  Primary    Relevant Orders    LIQUID-BASED PAP SMEAR, [facility] ([facility])    Atypical squamous cells of undetermined significance on cytologic smear of cervix (ASC-US)        Relevant Orders    LIQUID-BASED PAP SMEAR, [facility] ([facility])    Encounter for breast cancer screening using non-mammogram modality        Relevant Orders    Mammo Screening  Digital Tomosynthesis Bilateral With CAD          1. GYN annual well woman exam.   2. Pap guidelines reviewed.  3. Recommended use of Vitamin D replacement and getting adequate calcium in her diet. (1500mg)  4. Reviewed monthly self breast exams.  Instructed to call with lumps, pain, or breast discharge.    5. Continue yearly mammography  6. Reviewed HPV guidelines.  7. Reviewed exercise as a preventative health measures.    8. Happy with ablation  9. Return in about 1 year (around 3/14/2024).     Sherley Urena MD  03/14/2023

## 2023-03-16 LAB — REF LAB TEST METHOD: NORMAL

## 2023-09-22 ENCOUNTER — HOSPITAL ENCOUNTER (OUTPATIENT)
Dept: MAMMOGRAPHY | Facility: HOSPITAL | Age: 43
Discharge: HOME | End: 2023-09-22
Admitting: OBSTETRICS & GYNECOLOGY
Payer: COMMERCIAL

## 2023-09-22 DIAGNOSIS — R92.8 ABNORMAL MAMMOGRAM: ICD-10-CM

## 2023-09-22 PROCEDURE — G0279 TOMOSYNTHESIS, MAMMO: HCPCS

## 2023-09-22 PROCEDURE — 77066 DX MAMMO INCL CAD BI: CPT

## 2023-10-09 ENCOUNTER — TRANSCRIBE ORDERS (OUTPATIENT)
Dept: ADMINISTRATIVE | Facility: HOSPITAL | Age: 43
End: 2023-10-09
Payer: COMMERCIAL

## 2023-10-09 DIAGNOSIS — Z12.31 SCREENING MAMMOGRAM FOR BREAST CANCER: Primary | ICD-10-CM
